# Patient Record
Sex: MALE | Race: WHITE | HISPANIC OR LATINO | URBAN - METROPOLITAN AREA
[De-identification: names, ages, dates, MRNs, and addresses within clinical notes are randomized per-mention and may not be internally consistent; named-entity substitution may affect disease eponyms.]

---
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
## 2025-01-13 ENCOUNTER — NON-APPOINTMENT (OUTPATIENT)
Age: 20
End: 2025-01-13

## 2025-05-10 ENCOUNTER — EMERGENCY (EMERGENCY)
Facility: HOSPITAL | Age: 20
LOS: 1 days | End: 2025-05-10
Attending: EMERGENCY MEDICINE | Admitting: EMERGENCY MEDICINE
Payer: COMMERCIAL

## 2025-05-10 VITALS
RESPIRATION RATE: 18 BRPM | HEIGHT: 69 IN | SYSTOLIC BLOOD PRESSURE: 119 MMHG | DIASTOLIC BLOOD PRESSURE: 80 MMHG | OXYGEN SATURATION: 96 % | TEMPERATURE: 98 F | HEART RATE: 60 BPM | WEIGHT: 147.93 LBS

## 2025-05-10 PROCEDURE — 73030 X-RAY EXAM OF SHOULDER: CPT | Mod: 26,LT

## 2025-05-10 PROCEDURE — 99284 EMERGENCY DEPT VISIT MOD MDM: CPT

## 2025-05-10 RX ORDER — NAPROXEN SODIUM 275 MG
1 TABLET ORAL
Qty: 14 | Refills: 0
Start: 2025-05-10 | End: 2025-05-16

## 2025-05-10 RX ORDER — ACETAMINOPHEN 500 MG/5ML
650 LIQUID (ML) ORAL ONCE
Refills: 0 | Status: COMPLETED | OUTPATIENT
Start: 2025-05-10 | End: 2025-05-10

## 2025-05-10 RX ORDER — IBUPROFEN 200 MG
400 TABLET ORAL ONCE
Refills: 0 | Status: COMPLETED | OUTPATIENT
Start: 2025-05-10 | End: 2025-05-10

## 2025-05-10 RX ADMIN — Medication 400 MILLIGRAM(S): at 14:18

## 2025-05-10 RX ADMIN — Medication 650 MILLIGRAM(S): at 14:18

## 2025-05-10 NOTE — ED PROVIDER NOTE - NSFOLLOWUPINSTRUCTIONS_ED_ALL_ED_FT
For pain acetaminophen 650 mg every 4-6 hours and/or ibuprofen 400 mg (take with food/milk) every 8 hours as needed     If you develop worsening symptoms including chest pain, difficulty breathing, abdominal pain, nausea/vomiting, headache, return to the emergency department    Please follow-up with your primary care physician next week We've got you covered from head to toe    Our specialty programs:    [facility]  [phone]  [email or web address]    Sports Program  [phone]  [email or web address]        Erick (Scheduling) team hours of operation:  Monday through Thursday, 7am to 8:30pm  Friday, 7am to 7pm  Saturday, 8am to 4pm    For pain acetaminophen 650 mg every 4-6 hours and/or ibuprofen 400 mg (take with food/milk) every 8 hours as needed     If you develop worsening symptoms including chest pain, difficulty breathing, abdominal pain, nausea/vomiting, headache, return to the emergency department    Please follow-up with your primary care physician next week
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
## 2025-05-10 NOTE — ED ADULT NURSE REASSESSMENT NOTE - NS ED NURSE REASSESS COMMENT FT1
Pt  received from primary RN stable. Respirations even and unlabored. Pt expresses slight discomfort at this time.  Awaiting XRay results. Safety precautions in place.
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
## 2025-05-10 NOTE — ED PROVIDER NOTE - PATIENT PORTAL LINK FT
You can access the FollowMyHealth Patient Portal offered by [facility] by registering at the following website: [email or web address]. By joining [employer]’s FollowMyHealth portal, you will also be able to view your health information using other applications (apps) compatible with our system.

## 2025-05-10 NOTE — ED PROVIDER NOTE - PHYSICAL EXAMINATION
Attending/Latanya: Well-appearing, NAD, head-no signs of trauma; PERRL/EOMI, no cspine PT, supple, no ANDI, no JVD, RRR, CTAB; Abd-soft, NT/ND, no HSM; no LE edema, Left shoulder-+FROM, +mild ant humeral head PT, no crepitus, motor/sensory intact, A&Ox3, nonfocal; Skin-warm/dry/ni signs of ecchymosis